# Patient Record
Sex: FEMALE | Race: WHITE | Employment: FULL TIME | URBAN - METROPOLITAN AREA
[De-identification: names, ages, dates, MRNs, and addresses within clinical notes are randomized per-mention and may not be internally consistent; named-entity substitution may affect disease eponyms.]

---

## 2020-09-22 ENCOUNTER — OFFICE VISIT (OUTPATIENT)
Dept: OBGYN CLINIC | Age: 40
End: 2020-09-22
Payer: COMMERCIAL

## 2020-09-22 ENCOUNTER — HOSPITAL ENCOUNTER (OUTPATIENT)
Age: 40
Setting detail: SPECIMEN
Discharge: HOME | End: 2020-09-22
Payer: COMMERCIAL

## 2020-09-22 VITALS
SYSTOLIC BLOOD PRESSURE: 107 MMHG | DIASTOLIC BLOOD PRESSURE: 79 MMHG | HEIGHT: 63 IN | WEIGHT: 223 LBS | HEART RATE: 84 BPM | BODY MASS INDEX: 39.51 KG/M2

## 2020-09-22 LAB
ABSOLUTE EOS #: 0.19 K/UL (ref 0–0.44)
ABSOLUTE IMMATURE GRANULOCYTE: 0.03 K/UL (ref 0–0.3)
ABSOLUTE LYMPH #: 2.48 K/UL (ref 1.1–3.7)
ABSOLUTE MONO #: 0.64 K/UL (ref 0.1–1.2)
BASOPHILS # BLD: 1 % (ref 0–2)
BASOPHILS ABSOLUTE: 0.12 K/UL (ref 0–0.2)
DIFFERENTIAL TYPE: NORMAL
EOSINOPHILS RELATIVE PERCENT: 2 % (ref 1–4)
HCT VFR BLD CALC: 43.9 % (ref 36.3–47.1)
HEMOGLOBIN: 13.6 G/DL (ref 11.9–15.1)
IMMATURE GRANULOCYTES: 0 %
LYMPHOCYTES # BLD: 27 % (ref 24–43)
MCH RBC QN AUTO: 29.2 PG (ref 25.2–33.5)
MCHC RBC AUTO-ENTMCNC: 31 G/DL (ref 28.4–34.8)
MCV RBC AUTO: 94.4 FL (ref 82.6–102.9)
MONOCYTES # BLD: 7 % (ref 3–12)
NRBC AUTOMATED: 0 PER 100 WBC
PDW BLD-RTO: 13.2 % (ref 11.8–14.4)
PLATELET # BLD: NORMAL K/UL (ref 138–453)
PLATELET ESTIMATE: NORMAL
PLATELET, FLUORESCENCE: 203 K/UL (ref 138–453)
PLATELET, IMMATURE FRACTION: 16.8 % (ref 1.1–10.3)
PMV BLD AUTO: NORMAL FL (ref 8.1–13.5)
RBC # BLD: 4.65 M/UL (ref 3.95–5.11)
RBC # BLD: NORMAL 10*6/UL
SEG NEUTROPHILS: 62 % (ref 36–65)
SEGMENTED NEUTROPHILS ABSOLUTE COUNT: 5.69 K/UL (ref 1.5–8.1)
TSH SERPL DL<=0.05 MIU/L-ACNC: 1.08 MIU/L (ref 0.3–5)
WBC # BLD: 9.2 K/UL (ref 3.5–11.3)
WBC # BLD: NORMAL 10*3/UL

## 2020-09-22 PROCEDURE — 99385 PREV VISIT NEW AGE 18-39: CPT | Performed by: OBSTETRICS & GYNECOLOGY

## 2020-09-22 ASSESSMENT — ENCOUNTER SYMPTOMS
COUGH: 0
ABDOMINAL PAIN: 0
SHORTNESS OF BREATH: 0
BACK PAIN: 0

## 2020-09-22 NOTE — PROGRESS NOTES
[facility] PHYSICIANS  [facility] - [address]  Dept: [phone]    Chief complaint:   Chief Complaint   Patient presents with    New Patient    Gynecologic Exam     Last pap pt states was 3 years ago in [place]        History Present Illness: Lydia Aviles is a 46 yo female who presents for her annual exam, and to establish care. She says that her periods are getting longer and are lasting 7-9 days now. She says she is having lots of clots. She says they are very painful as well. She is sexually active and denies any dyspareunia. She denies any vaginal discharge. She is single in a relationship with a male partner. She denies any bowel or bladder issues. Current Medications (OTC/Herbal): No current outpatient medications on file. No current facility-administered medications for this visit.       Allergies: No Known Allergies  Past Medical History:   Past Medical History:   Diagnosis Date    Abnormal Pap smear of cervix      Past Surgical History:   Past Surgical History:   Procedure Laterality Date     SECTION       SECTION      CHOLECYSTECTOMY  2009    LEEP      TUBAL LIGATION       Obstetric History:   4  Para 2  Gynecologic History: LMP 21   Menarche 13  Duration 7-9 d    Interval q  30 d  Tampons/Pads in a day: 12-15  Last Pap: 3 yrs ago       Any history of abnormal paps yes    PriorColpo/Biopsy LEEP in     Last Mammogram n/a  Contraception: BTL  Complications: none  STDs: none  Psychosocial History: Occupation:      Caffeine Yes    At risk for depression No    Abuse:   No  Seatbelt:   Yes  Exercise:  No    Social History     Socioeconomic History    Marital status: Single     Spouse name: Not on file    Number of children: Not on file    Years of education: Not on file    Highest education level: Not on file   Occupational History    Not on file   Social Needs    Financial resource strain: Not on file    Food insecurity     Worry: Not on file     Inability: Not on file    Transportation needs     Medical: Not on file     Non-medical: Not on file   Tobacco Use    Smoking status: Current Every Day Smoker    Smokeless tobacco: Never Used   Substance and Sexual Activity    Alcohol use: Yes     Comment: social     Drug use: Never    Sexual activity: Yes     Partners: Male   Lifestyle    Physical activity     Days per week: Not on file     Minutes per session: Not on file    Stress: Not on file   Relationships    Social connections     Talks on phone: Not on file     Gets together: Not on file     Attends Worship service: Not on file     Active member of club or organization: Not on file     Attends meetings of clubs or organizations: Not on file     Relationship status: Not on file    Intimate partner violence     Fear of current or ex partner: Not on file     Emotionally abused: Not on file     Physically abused: Not on file     Forced sexual activity: Not on file   Other Topics Concern    Not on file   Social History Narrative    Not on file       History reviewed. No pertinent family history. Review of Systems:   Review of Systems   Constitutional: Negative for chills and fever. HENT: Negative for congestion. Respiratory: Negative for cough and shortness of breath. Cardiovascular: Negative for chest pain and palpitations. Gastrointestinal: Negative for abdominal pain. Musculoskeletal: Negative for back pain. Neurological: Negative for dizziness and light-headedness. Psychiatric/Behavioral: The patient is not nervous/anxious. Physical exam:  vitals:  Height   5  ft    3 in,  Weight    223 lbs,   107/79 BP  Gen: alert, no apparent distress  HEENT:No pathologic skin lesions noted,NC/AT,PERRL, normal midline nontender thyroid   Lung Exam: Clear to auscultation in all fields bilaterally, without wheezes,rales or rhonchi.   Cardiac Exam: Normal sinus rhythm andrate, without murmurs, rubs or gallops appreciated. Breast Exam: Symmetric without pathological skin changes, nontender without discrete suspicious masses palpated, supraclavicular or axillary adenopathy or nipple discharge noted. Abdominal Exam: Nontender to deep palpation without organomegaly, masses or CVAT appreciated, BS positive. No spinal deformation or tenderness. External Genitalia: Normal development without vulvar,vaginal or cervical lesions noted. Normal vaginal discharge, uterus anterior, 4-6 weeks without CMT. Adnexa nontender without abnormal masses bilaterally. Rectal Exam: Omitted. Extremities: Nontender without clubbing, cyanosis or edema. F.R.O.M. Neurologic Exam: Grossly intact without noted sensorimotor deficits and oriented x 3. Assessment/Plan:   Unremarkable annual Gyn exam.    Cervical Cytology Evaluation begins at 24years old. If Negative Cytology, Follow-up screening per current guidelines. Mammograms every 1year. If 35 yo and last mammogram was negative. Colonoscopy screening reviewed as well as onset for bone density testing. Heavy menstrual bleeding - CBC and TSH ordered. Discussed options of hormonal treatment vs surgical.  Discussed risks/benefits of each in detail. Info about IUD, ablation and hysterectomy given. Birth control and barrier recommendations discussed. STD counseling and prevention reviewed. Routine health maintenance per patients PCP.   Pt to follow up for annual exam in 1 year    Kris Lau MD  [address]

## 2020-09-27 LAB
HPV SOURCE: NORMAL
HPV, GENOTYPE 16: NOT DETECTED
HPV, GENOTYPE 18: NOT DETECTED
HPV, HIGH RISK OTHER: NOT DETECTED

## 2020-10-02 LAB — CYTOLOGY REPORT: NORMAL

## 2020-10-05 RX ORDER — METRONIDAZOLE 500 MG/1
500 TABLET ORAL 2 TIMES DAILY
Qty: 14 TABLET | Refills: 0 | Status: SHIPPED | OUTPATIENT
Start: 2020-10-05 | End: 2020-10-12

## 2020-11-09 ENCOUNTER — PROCEDURE VISIT (OUTPATIENT)
Dept: OBGYN CLINIC | Age: 40
End: 2020-11-09
Payer: COMMERCIAL

## 2020-11-09 ENCOUNTER — HOSPITAL ENCOUNTER (OUTPATIENT)
Age: 40
Setting detail: SPECIMEN
Discharge: HOME | End: 2020-11-09
Payer: COMMERCIAL

## 2020-11-09 VITALS
BODY MASS INDEX: 40.09 KG/M2 | SYSTOLIC BLOOD PRESSURE: 119 MMHG | HEART RATE: 96 BPM | WEIGHT: 226.25 LBS | HEIGHT: 63 IN | DIASTOLIC BLOOD PRESSURE: 83 MMHG

## 2020-11-09 PROCEDURE — 58100 BIOPSY OF UTERUS LINING: CPT | Performed by: OBSTETRICS & GYNECOLOGY

## 2020-11-09 NOTE — PROGRESS NOTES
[facility] PHYSICIANS  [facility] - [address] [facility]  Dept: [phone]    11/9/2020    Alyse Hui is a 45 y.o. female, [id number]      Patient's last menstrual period was 10/15/2020. Chief Complaint   Patient presents with    Procedure     EMB        LABS:  UPT: not done, pt previously had a BTL    No visits with results within 6 Week(s) from this visit.    Latest known visit with results is:   Hospital Outpatient Visit on 09/22/2020   Component Date Value Ref Range Status    TSH 09/22/2020 1.08  0.30 - 5.00 mIU/L Final    WBC 09/22/2020 9.2  3.5 - 11.3 k/uL Final    RBC 09/22/2020 4.65  3.95 - 5.11 m/uL Final    Hemoglobin 09/22/2020 13.6  11.9 - 15.1 g/dL Final    Hematocrit 09/22/2020 43.9  36.3 - 47.1 % Final    MCV 09/22/2020 94.4  82.6 - 102.9 fL Final    MCH 09/22/2020 29.2  25.2 - 33.5 pg Final    MCHC 09/22/2020 31.0  28.4 - 34.8 g/dL Final    RDW 09/22/2020 13.2  11.8 - 14.4 % Final    Platelets 06/77/4290 See Reflexed IPF Result  138 - 453 k/uL Final    MPV 09/22/2020 NOT REPORTED  8.1 - 13.5 fL Final    NRBC Automated 09/22/2020 0.0  0.0 per 100 WBC Final    Differential Type 09/22/2020 NOT REPORTED   Final    WBC Morphology 09/22/2020 NOT REPORTED   Final    RBC Morphology 09/22/2020 NOT REPORTED   Final    Platelet Estimate 21/15/3102 NOT REPORTED   Final    Seg Neutrophils 09/22/2020 62  36 - 65 % Final    Lymphocytes 09/22/2020 27  24 - 43 % Final    Monocytes 09/22/2020 7  3 - 12 % Final    Eosinophils % 09/22/2020 2  1 - 4 % Final    Basophils 09/22/2020 1  0 - 2 % Final    Immature Granulocytes 09/22/2020 0  0 % Final    Segs Absolute 09/22/2020 5.69  1.50 - 8.10 k/uL Final    Absolute Lymph # 09/22/2020 2.48  1.10 - 3.70 k/uL Final    Absolute Mono # 09/22/2020 0.64  0.10 - 1.20 k/uL Final    Absolute Eos # 09/22/2020 0.19  0.00 - 0.44 k/uL Final    Basophils Absolute 09/22/2020 0.12  0.00 - 0.20 k/uL Final  Absolute Immature Granulocyte 09/22/2020 0.03  0.00 - 0.30 k/uL Final    Platelet, Immature Fraction 09/22/2020 16.8* 1.1 - 10.3 % Final    Platelet, Fluorescence 09/22/2020 203  138 - 453 k/uL Final    Cytology Report 09/22/2020    Final-Edited                    Value:INTERPRETATION    Cervical material, (ThinPrep vial, Imaging-assisted review):  Specimen Adequacy:       Satisfactory for evaluation.       - Endocervical/transformation zone component present. Descriptive Diagnosis:       Negative for intraepithelial lesion or malignancy. Trichomonas vaginalis present. Shift in kaleb suggestive of bacterial vaginosis. Comments:       High Risk HPV testing was ordered. Cytotechnologist:   JAMES Reed JD(ASCP)  **Electronically Signed Out**  fnay/10/2/2020          Procedure/Addendum  HPV Procedure Report     Date Ordered:     9/23/2020     Status:  Signed Out       Date Complete:     9/23/2020     By: JAMES Wells(ASCP)       Date Reported:     10/2/2020       INTERPRETATION  Aptima HPV DNA High Risk                                  HPV Sample               Thin Prep                    (Ref Range)  HPV Type 16               Not Detected                    (Not  Detected)  HPV Type 18               Not Detected                    (Not  De                          tected)  Other High Risk HPV     Not Detected                    (Not Detected)    HPV by Nucleic Acid Amplification     This test detects high-risk HPV types (16, 18, 31, 33, 35, 39, 45,  51, 52, 56, 58, 59, 66, and 68) and differentiates HPV 16 and 18  associated with cervical cancer and its precursor lesions. Sensitivity may be affected by specimen collection methods, stage of  infection, and the presence of interfering substances. Results should  be interpreted in conjunction with other available laboratory and  clinical data.   A negative high-risk HPV result does not exclude the  presence of other high-risk HPV types, the and 18   associated with cervical cancer and its precursor lesions. Sensitivity may be affected by specimen collection methods, stage   of infection, and the presence of interfering substances. Results   should be interpreted in conjunction with other available   laboratory and clinical data. A negative high-risk HPV result does   not exclude the presence of other high-risk HPV types, the   possibility of future cytologic abnormalities, underlying CIN2-3,   or cancer. This test is intended for medical purposes only and is not valid   for the evaluation of suspected sexual abuse or for other forensic   purposes. HPV testing should not be used for screening or   management of atypical squamous cells of undetermined significance   (ASCUS) in women under age 24. Performed By: Dom Butler [address]  [place], [address] [facility]  : Vicki Vegas. Rosa Almaraz MD     ]    Past Medical History:   Diagnosis Date    Abnormal Pap smear of cervix          Past Surgical History:   Procedure Laterality Date     SECTION       SECTION      CHOLECYSTECTOMY  2009    LEEP  2006    TUBAL LIGATION           History reviewed. No pertinent family history. Social History     Tobacco Use    Smoking status: Current Every Day Smoker    Smokeless tobacco: Never Used   Substance Use Topics    Alcohol use: Yes     Comment: social     Drug use: Never         No current outpatient medications on file. No current facility-administered medications for this visit. Allergies as of 2020    (No Known Allergies)         Diagnostics:  No results found. Blood pressure 119/83, pulse 96, height 5' 3\" (1.6 m), weight 226 lb 4 oz (102.6 kg), last menstrual period 10/15/2020. Chaperone for Intimate Exam   Chaperone was offered and accepted as part of the rooming process.    Chaperone: Estelle Waters         The patient was counseled on the procedure. Risks, benefits and alternatives were reviewed. The patient is aware that this is diagnostic and not curative and a second procedure may be needed. A consent was reviewed and obtained. The patient was positioned comfortably on the exam table. After a bi-manual exam; the uterus was found to be  anteverted with a size of 9 cm. There was no adnexal masses and the bladder was smooth, non-tender and without palpable masses. A sterile speculum was placed into the vagina and the cervix was identified. It was stabilized with a tenaculum clamp. It was cleansed with betadine and the aspirator was then gently passed into the endometrial cavity. Tissue was obtained and sent to pathology. The patient tolerated the procedure well. Post procedure restrictions were reviewed and given to the patient. All counts and instruments were correct at the end of the procedure. Assessment:   Diagnosis Orders   1. Menorrhagia with regular cycle  57300 - OR BIOPSY OF UTERUS LINING     There are no active problems to display for this patient. PLAN:  Pt is desiring a hysterectomy. Discussed with her h/o 2 prior LTCS recommendation for RALH with BS and cysto. Pt in agreement. Discussed risks of surgery in normal detailed fashion. All questions answered.   Will schedule pt for RALH, BS and cysto    Swathi Stout MD

## 2020-11-11 LAB — SURGICAL PATHOLOGY REPORT: NORMAL

## 2021-01-05 ENCOUNTER — TELEPHONE (OUTPATIENT)
Dept: OBGYN CLINIC | Age: 41
End: 2021-01-05

## 2021-01-05 NOTE — TELEPHONE ENCOUNTER
LVM letting pt know we need her to call back to verify her insurance. Krishan Later from [facility]. V's stated he talked to her insurance and they said her coverage ended 12/31/20.

## 2021-01-08 ENCOUNTER — TELEPHONE (OUTPATIENT)
Dept: OBGYN CLINIC | Age: 41
End: 2021-01-08

## 2021-01-08 NOTE — TELEPHONE ENCOUNTER
[facility] called she wanted to let us know they are reaching out to the pt because she will be marked as self pay for the surgery because her benefits do not cover her surgery

## 2021-01-10 ENCOUNTER — HOSPITAL ENCOUNTER (OUTPATIENT)
Dept: LAB | Age: 41
Setting detail: SPECIMEN
Discharge: HOME | End: 2021-01-10
Payer: COMMERCIAL

## 2021-01-10 DIAGNOSIS — Z01.818 PREOP TESTING: Primary | ICD-10-CM

## 2021-01-10 PROCEDURE — U0003 INFECTIOUS AGENT DETECTION BY NUCLEIC ACID (DNA OR RNA); SEVERE ACUTE RESPIRATORY SYNDROME CORONAVIRUS 2 (SARS-COV-2) (CORONAVIRUS DISEASE [COVID-19]), AMPLIFIED PROBE TECHNIQUE, MAKING USE OF HIGH THROUGHPUT TECHNOLOGIES AS DESCRIBED BY CMS-2020-01-R: HCPCS

## 2021-01-10 PROCEDURE — U0005 INFEC AGEN DETEC AMPLI PROBE: HCPCS

## 2021-01-11 LAB
SARS-COV-2, RAPID: NORMAL
SARS-COV-2: NORMAL
SARS-COV-2: NOT DETECTED
SOURCE: NORMAL

## 2021-01-13 ENCOUNTER — ANESTHESIA EVENT (OUTPATIENT)
Dept: OPERATING ROOM | Age: 41
End: 2021-01-13

## 2021-01-14 ENCOUNTER — HOSPITAL ENCOUNTER (OUTPATIENT)
Age: 41
Setting detail: OUTPATIENT SURGERY
Discharge: HOME | End: 2021-01-14
Attending: OBSTETRICS & GYNECOLOGY | Admitting: OBSTETRICS & GYNECOLOGY
Payer: COMMERCIAL

## 2021-01-14 ENCOUNTER — ANESTHESIA (OUTPATIENT)
Dept: OPERATING ROOM | Age: 41
End: 2021-01-14

## 2021-01-14 VITALS
TEMPERATURE: 97.2 F | HEART RATE: 76 BPM | SYSTOLIC BLOOD PRESSURE: 124 MMHG | RESPIRATION RATE: 16 BRPM | WEIGHT: 230 LBS | BODY MASS INDEX: 40.75 KG/M2 | HEIGHT: 63 IN | OXYGEN SATURATION: 100 % | DIASTOLIC BLOOD PRESSURE: 76 MMHG

## 2021-01-14 VITALS — TEMPERATURE: 97.4 F | SYSTOLIC BLOOD PRESSURE: 112 MMHG | DIASTOLIC BLOOD PRESSURE: 99 MMHG | OXYGEN SATURATION: 100 %

## 2021-01-14 DIAGNOSIS — G89.18 POST-OP PAIN: Primary | ICD-10-CM

## 2021-01-14 PROBLEM — Z90.710 H/O TOTAL HYSTERECTOMY: Status: ACTIVE | Noted: 2021-01-14

## 2021-01-14 LAB
ABO/RH: NORMAL
ANTIBODY SCREEN: NEGATIVE
ARM BAND NUMBER: NORMAL
EXPIRATION DATE: NORMAL
HCG, PREGNANCY URINE (POC): NEGATIVE
HCT VFR BLD CALC: 40.8 % (ref 36.3–47.1)
HEMOGLOBIN: 12.3 G/DL (ref 11.9–15.1)
MCH RBC QN AUTO: 29.8 PG (ref 25.2–33.5)
MCHC RBC AUTO-ENTMCNC: 30.1 G/DL (ref 28.4–34.8)
MCV RBC AUTO: 98.8 FL (ref 82.6–102.9)
NRBC AUTOMATED: 0 PER 100 WBC
PDW BLD-RTO: 13.2 % (ref 11.8–14.4)
PLATELET # BLD: 154 K/UL (ref 138–453)
PMV BLD AUTO: 12.5 FL (ref 8.1–13.5)
RBC # BLD: 4.13 M/UL (ref 3.95–5.11)
WBC # BLD: 10.4 K/UL (ref 3.5–11.3)

## 2021-01-14 PROCEDURE — 3600000009 HC SURGERY ROBOT BASE: Performed by: OBSTETRICS & GYNECOLOGY

## 2021-01-14 PROCEDURE — 3600000019 HC SURGERY ROBOT ADDTL 15MIN: Performed by: OBSTETRICS & GYNECOLOGY

## 2021-01-14 PROCEDURE — 6360000002 HC RX W HCPCS

## 2021-01-14 PROCEDURE — 3700000000 HC ANESTHESIA ATTENDED CARE: Performed by: OBSTETRICS & GYNECOLOGY

## 2021-01-14 PROCEDURE — 2580000003 HC RX 258: Performed by: ANESTHESIOLOGY

## 2021-01-14 PROCEDURE — 88307 TISSUE EXAM BY PATHOLOGIST: CPT

## 2021-01-14 PROCEDURE — 2500000003 HC RX 250 WO HCPCS: Performed by: ANESTHESIOLOGY

## 2021-01-14 PROCEDURE — 2500000003 HC RX 250 WO HCPCS: Performed by: NURSE ANESTHETIST, CERTIFIED REGISTERED

## 2021-01-14 PROCEDURE — 6360000002 HC RX W HCPCS: Performed by: NURSE ANESTHETIST, CERTIFIED REGISTERED

## 2021-01-14 PROCEDURE — 58571 TLH W/T/O 250 G OR LESS: CPT | Performed by: OBSTETRICS & GYNECOLOGY

## 2021-01-14 PROCEDURE — 86900 BLOOD TYPING SEROLOGIC ABO: CPT

## 2021-01-14 PROCEDURE — 64488 TAP BLOCK BI INJECTION: CPT | Performed by: ANESTHESIOLOGY

## 2021-01-14 PROCEDURE — 85027 COMPLETE CBC AUTOMATED: CPT

## 2021-01-14 PROCEDURE — 86901 BLOOD TYPING SEROLOGIC RH(D): CPT

## 2021-01-14 PROCEDURE — 7100000001 HC PACU RECOVERY - ADDTL 15 MIN: Performed by: OBSTETRICS & GYNECOLOGY

## 2021-01-14 PROCEDURE — 2500000003 HC RX 250 WO HCPCS: Performed by: OBSTETRICS & GYNECOLOGY

## 2021-01-14 PROCEDURE — 52000 CYSTOURETHROSCOPY: CPT | Performed by: OBSTETRICS & GYNECOLOGY

## 2021-01-14 PROCEDURE — 86850 RBC ANTIBODY SCREEN: CPT

## 2021-01-14 PROCEDURE — 6360000002 HC RX W HCPCS: Performed by: OBSTETRICS & GYNECOLOGY

## 2021-01-14 PROCEDURE — S2900 ROBOTIC SURGICAL SYSTEM: HCPCS | Performed by: OBSTETRICS & GYNECOLOGY

## 2021-01-14 PROCEDURE — 6370000000 HC RX 637 (ALT 250 FOR IP): Performed by: OBSTETRICS & GYNECOLOGY

## 2021-01-14 PROCEDURE — 3700000001 HC ADD 15 MINUTES (ANESTHESIA): Performed by: OBSTETRICS & GYNECOLOGY

## 2021-01-14 PROCEDURE — 2709999900 HC NON-CHARGEABLE SUPPLY: Performed by: OBSTETRICS & GYNECOLOGY

## 2021-01-14 PROCEDURE — 81025 URINE PREGNANCY TEST: CPT

## 2021-01-14 PROCEDURE — 7100000040 HC SPAR PHASE II RECOVERY - FIRST 15 MIN: Performed by: OBSTETRICS & GYNECOLOGY

## 2021-01-14 PROCEDURE — 2720000010 HC SURG SUPPLY STERILE: Performed by: OBSTETRICS & GYNECOLOGY

## 2021-01-14 PROCEDURE — 2580000003 HC RX 258: Performed by: OBSTETRICS & GYNECOLOGY

## 2021-01-14 PROCEDURE — 7100000000 HC PACU RECOVERY - FIRST 15 MIN: Performed by: OBSTETRICS & GYNECOLOGY

## 2021-01-14 RX ORDER — SODIUM CHLORIDE 0.9 % (FLUSH) 0.9 %
10 SYRINGE (ML) INJECTION EVERY 12 HOURS SCHEDULED
Status: DISCONTINUED | OUTPATIENT
Start: 2021-01-14 | End: 2021-01-14 | Stop reason: HOSPADM

## 2021-01-14 RX ORDER — MAGNESIUM HYDROXIDE 1200 MG/15ML
LIQUID ORAL PRN
Status: DISCONTINUED | OUTPATIENT
Start: 2021-01-14 | End: 2021-01-14 | Stop reason: ALTCHOICE

## 2021-01-14 RX ORDER — SODIUM CHLORIDE 0.9 % (FLUSH) 0.9 %
10 SYRINGE (ML) INJECTION PRN
Status: DISCONTINUED | OUTPATIENT
Start: 2021-01-14 | End: 2021-01-14 | Stop reason: HOSPADM

## 2021-01-14 RX ORDER — HYDROCODONE BITARTRATE AND ACETAMINOPHEN 5; 325 MG/1; MG/1
1 TABLET ORAL EVERY 4 HOURS PRN
Qty: 28 TABLET | Refills: 0 | Status: SHIPPED | OUTPATIENT
Start: 2021-01-14 | End: 2021-01-21

## 2021-01-14 RX ORDER — SODIUM CHLORIDE, SODIUM LACTATE, POTASSIUM CHLORIDE, CALCIUM CHLORIDE 600; 310; 30; 20 MG/100ML; MG/100ML; MG/100ML; MG/100ML
INJECTION, SOLUTION INTRAVENOUS CONTINUOUS
Status: DISCONTINUED | OUTPATIENT
Start: 2021-01-14 | End: 2021-01-14 | Stop reason: HOSPADM

## 2021-01-14 RX ORDER — MIDAZOLAM HYDROCHLORIDE 1 MG/ML
INJECTION INTRAMUSCULAR; INTRAVENOUS
Status: COMPLETED
Start: 2021-01-14 | End: 2021-01-14

## 2021-01-14 RX ORDER — DEXAMETHASONE SODIUM PHOSPHATE 4 MG/ML
INJECTION, SOLUTION INTRA-ARTICULAR; INTRALESIONAL; INTRAMUSCULAR; INTRAVENOUS; SOFT TISSUE PRN
Status: DISCONTINUED | OUTPATIENT
Start: 2021-01-14 | End: 2021-01-14 | Stop reason: SDUPTHER

## 2021-01-14 RX ORDER — FENTANYL CITRATE 50 UG/ML
50 INJECTION, SOLUTION INTRAMUSCULAR; INTRAVENOUS EVERY 5 MIN PRN
Status: DISCONTINUED | OUTPATIENT
Start: 2021-01-14 | End: 2021-01-14 | Stop reason: HOSPADM

## 2021-01-14 RX ORDER — SCOLOPAMINE TRANSDERMAL SYSTEM 1 MG/1
1 PATCH, EXTENDED RELEASE TRANSDERMAL ONCE
Status: DISCONTINUED | OUTPATIENT
Start: 2021-01-14 | End: 2021-01-14 | Stop reason: HOSPADM

## 2021-01-14 RX ORDER — MAGNESIUM HYDROXIDE 1200 MG/15ML
LIQUID ORAL CONTINUOUS PRN
Status: COMPLETED | OUTPATIENT
Start: 2021-01-14 | End: 2021-01-14

## 2021-01-14 RX ORDER — ROCURONIUM BROMIDE 10 MG/ML
INJECTION, SOLUTION INTRAVENOUS PRN
Status: DISCONTINUED | OUTPATIENT
Start: 2021-01-14 | End: 2021-01-14 | Stop reason: SDUPTHER

## 2021-01-14 RX ORDER — NEOSTIGMINE METHYLSULFATE 5 MG/5 ML
SYRINGE (ML) INTRAVENOUS PRN
Status: DISCONTINUED | OUTPATIENT
Start: 2021-01-14 | End: 2021-01-14 | Stop reason: SDUPTHER

## 2021-01-14 RX ORDER — GLYCOPYRROLATE 1 MG/5 ML
SYRINGE (ML) INTRAVENOUS PRN
Status: DISCONTINUED | OUTPATIENT
Start: 2021-01-14 | End: 2021-01-14 | Stop reason: SDUPTHER

## 2021-01-14 RX ORDER — FENTANYL CITRATE 50 UG/ML
25 INJECTION, SOLUTION INTRAMUSCULAR; INTRAVENOUS EVERY 5 MIN PRN
Status: DISCONTINUED | OUTPATIENT
Start: 2021-01-14 | End: 2021-01-14 | Stop reason: HOSPADM

## 2021-01-14 RX ORDER — KETOROLAC TROMETHAMINE 30 MG/ML
INJECTION, SOLUTION INTRAMUSCULAR; INTRAVENOUS PRN
Status: DISCONTINUED | OUTPATIENT
Start: 2021-01-14 | End: 2021-01-14 | Stop reason: SDUPTHER

## 2021-01-14 RX ORDER — FENTANYL CITRATE 50 UG/ML
50 INJECTION, SOLUTION INTRAMUSCULAR; INTRAVENOUS ONCE
Status: COMPLETED | OUTPATIENT
Start: 2021-01-14 | End: 2021-01-14

## 2021-01-14 RX ORDER — BUPIVACAINE HYDROCHLORIDE AND EPINEPHRINE 2.5; 5 MG/ML; UG/ML
INJECTION, SOLUTION EPIDURAL; INFILTRATION; INTRACAUDAL; PERINEURAL PRN
Status: DISCONTINUED | OUTPATIENT
Start: 2021-01-14 | End: 2021-01-14 | Stop reason: ALTCHOICE

## 2021-01-14 RX ORDER — PROPOFOL 10 MG/ML
INJECTION, EMULSION INTRAVENOUS PRN
Status: DISCONTINUED | OUTPATIENT
Start: 2021-01-14 | End: 2021-01-14 | Stop reason: SDUPTHER

## 2021-01-14 RX ORDER — MIDAZOLAM HYDROCHLORIDE 2 MG/2ML
1 INJECTION, SOLUTION INTRAMUSCULAR; INTRAVENOUS EVERY 10 MIN PRN
Status: DISCONTINUED | OUTPATIENT
Start: 2021-01-14 | End: 2021-01-14 | Stop reason: HOSPADM

## 2021-01-14 RX ORDER — MIDAZOLAM HYDROCHLORIDE 2 MG/2ML
1 INJECTION, SOLUTION INTRAMUSCULAR; INTRAVENOUS ONCE
Status: COMPLETED | OUTPATIENT
Start: 2021-01-14 | End: 2021-01-14

## 2021-01-14 RX ORDER — FENTANYL CITRATE 50 UG/ML
INJECTION, SOLUTION INTRAMUSCULAR; INTRAVENOUS PRN
Status: DISCONTINUED | OUTPATIENT
Start: 2021-01-14 | End: 2021-01-14 | Stop reason: SDUPTHER

## 2021-01-14 RX ORDER — LIDOCAINE HYDROCHLORIDE 10 MG/ML
1 INJECTION, SOLUTION EPIDURAL; INFILTRATION; INTRACAUDAL; PERINEURAL
Status: DISCONTINUED | OUTPATIENT
Start: 2021-01-14 | End: 2021-01-14 | Stop reason: HOSPADM

## 2021-01-14 RX ORDER — ONDANSETRON 2 MG/ML
INJECTION INTRAMUSCULAR; INTRAVENOUS PRN
Status: DISCONTINUED | OUTPATIENT
Start: 2021-01-14 | End: 2021-01-14 | Stop reason: SDUPTHER

## 2021-01-14 RX ORDER — BUPIVACAINE HYDROCHLORIDE 5 MG/ML
INJECTION, SOLUTION EPIDURAL; INTRACAUDAL
Status: COMPLETED | OUTPATIENT
Start: 2021-01-14 | End: 2021-01-14

## 2021-01-14 RX ORDER — ONDANSETRON 4 MG/1
4 TABLET, ORALLY DISINTEGRATING ORAL EVERY 8 HOURS PRN
Qty: 15 TABLET | Refills: 0 | Status: SHIPPED | OUTPATIENT
Start: 2021-01-14

## 2021-01-14 RX ORDER — BUPIVACAINE HYDROCHLORIDE 5 MG/ML
30 INJECTION, SOLUTION EPIDURAL; INTRACAUDAL ONCE
Status: COMPLETED | OUTPATIENT
Start: 2021-01-14 | End: 2021-01-14

## 2021-01-14 RX ORDER — IBUPROFEN 600 MG/1
600 TABLET ORAL EVERY 6 HOURS PRN
Qty: 60 TABLET | Refills: 0 | Status: SHIPPED | OUTPATIENT
Start: 2021-01-14

## 2021-01-14 RX ORDER — SIMETHICONE 80 MG
80 TABLET,CHEWABLE ORAL 4 TIMES DAILY PRN
Qty: 60 TABLET | Refills: 0 | Status: SHIPPED | OUTPATIENT
Start: 2021-01-14 | End: 2021-03-01

## 2021-01-14 RX ORDER — MEPERIDINE HYDROCHLORIDE 50 MG/ML
12.5 INJECTION INTRAMUSCULAR; INTRAVENOUS; SUBCUTANEOUS EVERY 5 MIN PRN
Status: DISCONTINUED | OUTPATIENT
Start: 2021-01-14 | End: 2021-01-14 | Stop reason: HOSPADM

## 2021-01-14 RX ORDER — DOCUSATE SODIUM 100 MG/1
100 CAPSULE, LIQUID FILLED ORAL 2 TIMES DAILY PRN
Qty: 60 CAPSULE | Refills: 0 | Status: SHIPPED | OUTPATIENT
Start: 2021-01-14 | End: 2021-03-01

## 2021-01-14 RX ORDER — FENTANYL CITRATE 50 UG/ML
INJECTION, SOLUTION INTRAMUSCULAR; INTRAVENOUS
Status: COMPLETED
Start: 2021-01-14 | End: 2021-01-14

## 2021-01-14 RX ORDER — LIDOCAINE HYDROCHLORIDE 10 MG/ML
INJECTION, SOLUTION EPIDURAL; INFILTRATION; INTRACAUDAL; PERINEURAL PRN
Status: DISCONTINUED | OUTPATIENT
Start: 2021-01-14 | End: 2021-01-14 | Stop reason: SDUPTHER

## 2021-01-14 RX ADMIN — FENTANYL CITRATE 50 MCG: 50 INJECTION, SOLUTION INTRAMUSCULAR; INTRAVENOUS at 07:28

## 2021-01-14 RX ADMIN — ROCURONIUM BROMIDE 50 MG: 10 INJECTION INTRAVENOUS at 07:28

## 2021-01-14 RX ADMIN — ROCURONIUM BROMIDE 10 MG: 10 INJECTION INTRAVENOUS at 08:28

## 2021-01-14 RX ADMIN — KETOROLAC TROMETHAMINE 30 MG: 30 INJECTION, SOLUTION INTRAMUSCULAR at 09:44

## 2021-01-14 RX ADMIN — ONDANSETRON 4 MG: 2 INJECTION INTRAMUSCULAR; INTRAVENOUS at 09:44

## 2021-01-14 RX ADMIN — Medication 3 MG: at 09:49

## 2021-01-14 RX ADMIN — FENTANYL CITRATE 50 MCG: 50 INJECTION, SOLUTION INTRAMUSCULAR; INTRAVENOUS at 08:04

## 2021-01-14 RX ADMIN — SODIUM CHLORIDE, POTASSIUM CHLORIDE, SODIUM LACTATE AND CALCIUM CHLORIDE: 600; 310; 30; 20 INJECTION, SOLUTION INTRAVENOUS at 07:34

## 2021-01-14 RX ADMIN — FENTANYL CITRATE 50 MCG: 50 INJECTION, SOLUTION INTRAMUSCULAR; INTRAVENOUS at 07:19

## 2021-01-14 RX ADMIN — SODIUM CHLORIDE, POTASSIUM CHLORIDE, SODIUM LACTATE AND CALCIUM CHLORIDE: 600; 310; 30; 20 INJECTION, SOLUTION INTRAVENOUS at 06:34

## 2021-01-14 RX ADMIN — BUPIVACAINE HYDROCHLORIDE 40 ML: 5 INJECTION, SOLUTION EPIDURAL; INTRACAUDAL; PERINEURAL at 07:45

## 2021-01-14 RX ADMIN — Medication 2 G: at 07:40

## 2021-01-14 RX ADMIN — DEXAMETHASONE SODIUM PHOSPHATE 4 MG: 4 INJECTION, SOLUTION INTRAMUSCULAR; INTRAVENOUS at 07:28

## 2021-01-14 RX ADMIN — ROCURONIUM BROMIDE 10 MG: 10 INJECTION INTRAVENOUS at 08:57

## 2021-01-14 RX ADMIN — MIDAZOLAM HYDROCHLORIDE 1 MG: 1 INJECTION, SOLUTION INTRAMUSCULAR; INTRAVENOUS at 07:11

## 2021-01-14 RX ADMIN — SODIUM CHLORIDE, POTASSIUM CHLORIDE, SODIUM LACTATE AND CALCIUM CHLORIDE: 600; 310; 30; 20 INJECTION, SOLUTION INTRAVENOUS at 07:20

## 2021-01-14 RX ADMIN — LIDOCAINE HYDROCHLORIDE 50 MG: 10 INJECTION, SOLUTION EPIDURAL; INFILTRATION; INTRACAUDAL; PERINEURAL at 07:28

## 2021-01-14 RX ADMIN — Medication 0.4 MG: at 09:49

## 2021-01-14 RX ADMIN — Medication 40 ML: at 07:15

## 2021-01-14 RX ADMIN — PROPOFOL 150 MG: 10 INJECTION, EMULSION INTRAVENOUS at 07:28

## 2021-01-14 RX ADMIN — MIDAZOLAM HYDROCHLORIDE 1 MG: 2 INJECTION, SOLUTION INTRAMUSCULAR; INTRAVENOUS at 07:11

## 2021-01-14 ASSESSMENT — PULMONARY FUNCTION TESTS
PIF_VALUE: 34
PIF_VALUE: 33
PIF_VALUE: 34
PIF_VALUE: 11
PIF_VALUE: 32
PIF_VALUE: 24
PIF_VALUE: 33
PIF_VALUE: 23
PIF_VALUE: 34
PIF_VALUE: 34
PIF_VALUE: 33
PIF_VALUE: 32
PIF_VALUE: 23
PIF_VALUE: 23
PIF_VALUE: 24
PIF_VALUE: 34
PIF_VALUE: 12
PIF_VALUE: 34
PIF_VALUE: 11
PIF_VALUE: 35
PIF_VALUE: 28
PIF_VALUE: 24
PIF_VALUE: 29
PIF_VALUE: 34
PIF_VALUE: 35
PIF_VALUE: 23
PIF_VALUE: 34
PIF_VALUE: 0
PIF_VALUE: 34
PIF_VALUE: 34
PIF_VALUE: 1
PIF_VALUE: 34
PIF_VALUE: 0
PIF_VALUE: 34
PIF_VALUE: 35
PIF_VALUE: 14
PIF_VALUE: 34
PIF_VALUE: 34
PIF_VALUE: 24
PIF_VALUE: 34
PIF_VALUE: 33
PIF_VALUE: 34
PIF_VALUE: 12
PIF_VALUE: 24
PIF_VALUE: 12
PIF_VALUE: 34
PIF_VALUE: 34
PIF_VALUE: 24
PIF_VALUE: 34
PIF_VALUE: 34
PIF_VALUE: 33
PIF_VALUE: 27
PIF_VALUE: 22
PIF_VALUE: 11
PIF_VALUE: 12
PIF_VALUE: 34
PIF_VALUE: 22
PIF_VALUE: 35
PIF_VALUE: 12
PIF_VALUE: 24
PIF_VALUE: 24
PIF_VALUE: 23
PIF_VALUE: 33
PIF_VALUE: 34
PIF_VALUE: 34
PIF_VALUE: 24
PIF_VALUE: 24
PIF_VALUE: 34
PIF_VALUE: 23
PIF_VALUE: 31
PIF_VALUE: 23
PIF_VALUE: 34
PIF_VALUE: 34
PIF_VALUE: 28

## 2021-01-14 ASSESSMENT — PAIN DESCRIPTION - DESCRIPTORS
DESCRIPTORS: PRESSURE
DESCRIPTORS: PRESSURE

## 2021-01-14 ASSESSMENT — PAIN SCALES - GENERAL
PAINLEVEL_OUTOF10: 2
PAINLEVEL_OUTOF10: 5
PAINLEVEL_OUTOF10: 2
PAINLEVEL_OUTOF10: 2

## 2021-01-14 ASSESSMENT — PAIN DESCRIPTION - LOCATION
LOCATION: ABDOMEN
LOCATION: ABDOMEN

## 2021-01-14 ASSESSMENT — PAIN DESCRIPTION - ORIENTATION
ORIENTATION: LOWER
ORIENTATION: LOWER

## 2021-01-14 ASSESSMENT — PAIN DESCRIPTION - PAIN TYPE: TYPE: SURGICAL PAIN

## 2021-01-14 NOTE — ANESTHESIA PROCEDURE NOTES
Peripheral Block    Patient location during procedure: pre-op  Start time: 1/14/2021 7:15 AM  End time: 1/14/2021 7:20 AM  Staffing  Performed: anesthesiologist   Anesthesiologist: Kannan Austin MD  Preanesthetic Checklist  Completed: patient identified, IV checked, site marked, risks and benefits discussed, surgical consent, monitors and equipment checked, pre-op evaluation, timeout performed, anesthesia consent given, oxygen available and patient being monitored  Peripheral Block  Patient position: supine  Prep: ChloraPrep  Patient monitoring: cardiac monitor, continuous pulse ox, frequent blood pressure checks and IV access  Block type: TAP and Rectus sheath  Laterality: bilateral  Injection technique: single-shot  Guidance: ultrasound guided  Local infiltration: lidocaine  Infiltration strength: 1 %  Dose: 3 mL  Provider prep: mask and sterile gloves  Local infiltration: lidocaine  Needle  Needle type: short-bevel   Needle gauge: 21 G  Needle length: 10 cm  Needle localization: ultrasound guidance  Needle insertion depth: 3 cm  Test dose: negative  Assessment  Injection assessment: negative aspiration for heme, no paresthesia on injection and local visualized surrounding nerve on ultrasound  Paresthesia pain: none  Slow fractionated injection: yes  Hemodynamics: stable  Additional Notes  U/S [id number].  (1) Under ultrasound guidance, a 21 gauge needle was inserted and placed in close proximity to the abdominal nerve.  (2) Ultrasound was also used to visualize the spread of the anesthetic in close proximity to the nerve being blocked. (3) The nerve appeared anatomically normal, and (4 there were no apparent abnormal pathological findings on the image that were readily visible and related to the nerve being blocked. (5) A permanent ultrasound image was saved in the patient's record.         Medications Administered  Bupivacaine (MARCAINE) PF injection 0.5%, 40 mL

## 2021-01-14 NOTE — ANESTHESIA PRE PROCEDURE
Department of Anesthesiology  Preprocedure Note       Name:  Reed Goodman   Age:  45 y.o.  :  1980                                          MRN:  [id number]         Date:  2021      Surgeon: Fabiana Crane):  Esther Juarez MD    Procedure: Procedure(s):  XI ROBOTIC LAPAROSCOPIC TOTAL HYSTERECTOMY, BILATERAL SALPINGECTOMY, CYSTO    Medications prior to admission:   Prior to Admission medications    Not on File       Current medications:    Current Facility-Administered Medications   Medication Dose Route Frequency Provider Last Rate Last Admin    ceFAZolin (ANCEF) 3 g in dextrose 5 % 100 mL IVPB  3 g Intravenous Once Esther Juarez MD        scopolamine (TRANSDERM-SCOP) transdermal patch 1 patch  1 patch Transdermal Once Esther Juarez MD        lactated ringers infusion   Intravenous Continuous Abdirashid Mcgraw MD           Allergies:  No Known Allergies    Problem List:  There is no problem list on file for this patient.       Past Medical History:        Diagnosis Date    Abnormal Pap smear of cervix     Menorrhagia     Personal history of kidney stones        Past Surgical History:        Procedure Laterality Date     SECTION  2013     SECTION      CHOLECYSTECTOMY  2009    LEEP  2006    LITHOTRIPSY  2008    X 2    TUBAL LIGATION         Social History:    Social History     Tobacco Use    Smoking status: Current Every Day Smoker     Packs/day: 0.50     Years: 20.00     Pack years: 10.00     Types: Cigarettes    Smokeless tobacco: Never Used   Substance Use Topics    Alcohol use: Yes     Comment: social                                 Ready to quit: Not Answered  Counseling given: Not Answered      Vital Signs (Current):   Vitals:    21 1211 21 0622   Weight: 230 lb (104.3 kg) 230 lb (104.3 kg)   Height: 5' 3\" (1.6 m) 5' 3\" (1.6 m)                                              BP Readings from Last 3 Encounters:   20 119/83 09/22/20 107/79       NPO Status: Time of last liquid consumption: 2330                        Time of last solid consumption: 2100                        Date of last liquid consumption: 01/13/21                        Date of last solid food consumption: 01/13/21    BMI:   Wt Readings from Last 3 Encounters:   01/14/21 230 lb (104.3 kg)   11/09/20 226 lb 4 oz (102.6 kg)   09/22/20 223 lb (101.2 kg)     Body mass index is 40.74 kg/m². CBC:   Lab Results   Component Value Date    WBC 9.2 09/22/2020    RBC 4.65 09/22/2020    HGB 13.6 09/22/2020    HCT 43.9 09/22/2020    MCV 94.4 09/22/2020    RDW 13.2 09/22/2020    PLT See Reflexed IPF Result 09/22/2020       CMP: No results found for: NA, K, CL, CO2, BUN, CREATININE, GFRAA, AGRATIO, LABGLOM, GLUCOSE, PROT, CALCIUM, BILITOT, ALKPHOS, AST, ALT    POC Tests: No results for input(s): POCGLU, POCNA, POCK, POCCL, POCBUN, POCHEMO, POCHCT in the last 72 hours.     Coags: No results found for: PROTIME, INR, APTT    HCG (If Applicable):   Lab Results   Component Value Date    HCG NEGATIVE 01/14/2021        ABGs: No results found for: PHART, PO2ART, AKJ3HIP, VDQ7MBR, BEART, P0VILKSC     Type & Screen (If Applicable):  No results found for: LABABO, LABRH    Drug/Infectious Status (If Applicable):  No results found for: HIV, HEPCAB    COVID-19 Screening (If Applicable):   Lab Results   Component Value Date    COVID19 Not Detected 01/10/2021         Anesthesia Evaluation  Patient summary reviewed no history of anesthetic complications:   Airway: Mallampati: II  TM distance: >3 FB   Neck ROM: full  Mouth opening: > = 3 FB Dental:          Pulmonary:Negative Pulmonary ROS and normal exam                               Cardiovascular:Negative CV ROS            Rhythm: regular  Rate: normal                    Neuro/Psych:   Negative Neuro/Psych ROS              GI/Hepatic/Renal: Neg GI/Hepatic/Renal ROS            Endo/Other: Negative Endo/Other ROS                    Abdominal: (+) obese,         Vascular: negative vascular ROS. Anesthesia Plan      general and regional     ASA 2       Induction: intravenous. Anesthetic plan and risks discussed with patient. Use of blood products discussed with patient whom consented to blood products. Plan discussed with CRNA.                   Vida Kline MD   1/14/2021

## 2021-01-14 NOTE — BRIEF OP NOTE
Brief Operative Note  Department of Obstetrics and Gynecology  [address] [facility]     Patient: Reed Goodman   : 1980  MRN: [id number]       Acct: [de-identified]   Date of Procedure: 21     Pre-operative Diagnosis: 45 y.o. female [id number]    Abnormal Uterine Bleeding   Hx  x2   Hx bilateral tubal ligation   Hx LEEP ()   Hx cholecystectomy    Obesity (BMI 40.74)    Post-operative Diagnosis: Same as above; Significant anterior abdominal wall adhesions     Procedure: Robotic Assisted Laparoscopic Hysterectomy with Bilateral Salpingectomy and Cystoscopy and Lysis of Anterior Abdominal wall Adhesions     Surgeon: Dr. Kriss Arteaga MD     Assistant(s): Dr. Niurka Serna; Santi Kirk, PGY-4; Galileo Schwartz, PGY-1    Anesthesia: general w/ pre-operative TAP block    Findings: On bimanual exam 9 cm uterus, anteverted with no adnexal fullness bilaterally. Normal external genitalia without lesions. Normal vaginal mucosa without prolapse. Normal appearing cervix without bleeding or lesions. Normal appearing uterus, bilateral ovaries and fallopian tubes. 2 Filshie clips noted on each fallopian tube, significant bowel and omental adhesions to anterior abdominal wall. Cystoscopy showed normal appearing bladder epithelium with no evidence of trauma, bilateral ureteral jets visualized. Total IV fluids/Blood products:  1300 ml  Urine Output:  150 ml    Estimated blood loss:  20ml  Drains:  none  Specimens:  Uterus, cervix and bilateral fallopian tubes w/ Filshie clips   Instrument and Sponge Count: Correct  Complications:  none  Condition:  stable, transferred to post anesthesia recovery    See full operative report for further details.       Santi Kirk DO  Ob/Gyn Resident  Pager: [phone]  2021, 10:25 AM

## 2021-01-14 NOTE — H&P
OB/GYN Pre-Op H&P  [facility]    Patient Name: Anton White     Patient : 1980  Room/Bed: STVZ OR POOL RM/NONE  Admission Date/Time: 2021  5:38 AM  Primary Care Physician: No primary care provider on file. MRN: [id number]    Date: 2021  Time: 7:09 AM    The patient was seen in pre-op holding. She is here for Robotic assisted laparoscopic hysterectomy, bilateral salpingectomy, cystoscopy. Starlett Hashimoto is a 36 y/o female w/ a Hx of menorrhagia w/ regular cycle, C/S x2, and bilateral tubal ligation. Patient states her periods occurs at regular intervals but complains of periods increasing in length, now up to 7-9 days. She reports heavy bleeding with clots and pain. Patient counseled on medical vs. Surgical management. Patient desires definitive surgical management for her abnormal uterine bleeding. Pre-op CBC and TSH wnl. Pap w/ co-testing performed on 20 which was completely benign. EMB performed on 20 which was negative for any atypia or malignancy. The procedure risks and complications were reviewed. The labs, Consent, and H&P were reviewed and updated. The patient was counseled on the possibility of  the need of a second surgery. The patient voiced understanding and had all of her questions answered. The possibility of incomplete removal of abnormal tissue was discussed. OBSTETRICAL HISTORY:   OB History    Para Term  AB Living   4 0 0 0 2 2   SAB TAB Ectopic Molar Multiple Live Births   2 0 0 0 0 0      # Outcome Date GA Lbr Mikal/2nd Weight Sex Delivery Anes PTL Lv   4       CS-LTranv      3       CS-LTranv      2 SAB            1 SAB                PAST MEDICAL HISTORY:   has a past medical history of Abnormal Pap smear of cervix, Menorrhagia, and Personal history of kidney stones. PAST SURGICAL HISTORY:   has a past surgical history that includes LEEP ();  section ();   section (); Tubal ligation (2015); Cholecystectomy (2009); and Lithotripsy (2008). ALLERGIES:  Allergies as of 11/10/2020    (No Known Allergies)       MEDICATIONS:  Current Facility-Administered Medications   Medication Dose Route Frequency Provider Last Rate Last Admin    ceFAZolin (ANCEF) 3 g in dextrose 5 % 100 mL IVPB  3 g Intravenous Once Greg Buitrago MD        scopolamine (TRANSDERM-SCOP) transdermal patch 1 patch  1 patch Transdermal Once Greg Buitrago MD   1 patch at 01/14/21 0645    lactated ringers infusion   Intravenous Continuous Giana Elias  mL/hr at 01/14/21 0634 New Bag at 01/14/21 0634    fentaNYL (SUBLIMAZE) injection 25 mcg  25 mcg Intravenous Q5 Min PRN Stephanie Millan MD        lactated ringers infusion   Intravenous Continuous Stephanie Millan MD        sodium chloride flush 0.9 % injection 10 mL  10 mL Intravenous 2 times per day Ziggy Driver MD        sodium chloride flush 0.9 % injection 10 mL  10 mL Intravenous PRN Ziggy Driver MD        lidocaine PF 1 % injection 1 mL  1 mL Intradermal Once PRN Stephanie Millan MD        midazolam PF (VERSED) injection 1 mg  1 mg Intravenous Q10 Min PRN Stephanie Millan MD        bupivacaine (PF) (MARCAINE) 0.5 % injection 150 mg  30 mL Infiltration Once Ziggy Driver MD        midazolam PF (VERSED) injection 1 mg  1 mg Intravenous Once Ziggy Driver MD        fentaNYL (SUBLIMAZE) injection 50 mcg  50 mcg Intravenous Once Ziggy Driver MD        midazolam (VERSED) 2 MG/2ML injection             fentaNYL (SUBLIMAZE) 100 MCG/2ML injection                FAMILY HISTORY:  family history includes No Known Problems in her father and mother. SOCIAL HISTORY:   reports that she has been smoking cigarettes. She has a 10.00 pack-year smoking history. She has never used smokeless tobacco. She reports current alcohol use. She reports that she does not use drugs.     VITALS:  Vitals:    01/11/21 1211 01/14/21 0622 01/14/21 3156 BP: 98/61   Pulse:   71   Resp:   20   Temp:   97 °F (36.1 °C)   TempSrc:   Temporal   SpO2:   100%   Weight: 230 lb (104.3 kg) 230 lb (104.3 kg)    Height: 5' 3\" (1.6 m) 5' 3\" (1.6 m)                                                                                                                                PHYSICAL EXAM:     Unchanged from Prior H&P  CONSTITUTIONAL:  Alert and oriented, no acute distress  HEAD: normocephalic, atraumatic  EYES: Pupils equal and reactive to light, Extraocular muscles intact, sclera non icteric  ENT: Mucus membranes moist, No otorrhea, no rhinorrhea  NECK:  supple, symmetrical, trachea midline   LUNGS:  Good air movement bilaterally, unlabored respirations, no wheezes or rhonchi  CARDIOVASCULAR: Regular rate and rhythm, no murmurs rubs or gallops  ABDOMEN: soft, non tender, non distended, no rebound or guarding, no hernias, no hepatomegaly, no splenomegly  MUSCULOSKELETAL:  Equal strength bilaterally, normal muscle tone  SKIN: No abscess or rash  NEUROLOGIC:  Cranial nerves 2-12 grossly intact, no focal deficits  PSYCH: affect appropriate  Pelvic Exam: deferred to OR      LAB RESULTS:  Admission on 01/14/2021   Component Date Value Ref Range Status    WBC 01/14/2021 10.4  3.5 - 11.3 k/uL Final    RBC 01/14/2021 4.13  3.95 - 5.11 m/uL Final    Hemoglobin 01/14/2021 12.3  11.9 - 15.1 g/dL Final    Hematocrit 01/14/2021 40.8  36.3 - 47.1 % Final    MCV 01/14/2021 98.8  82.6 - 102.9 fL Final    MCH 01/14/2021 29.8  25.2 - 33.5 pg Final    MCHC 01/14/2021 30.1  28.4 - 34.8 g/dL Final    RDW 01/14/2021 13.2  11.8 - 14.4 % Final    Platelets 13/17/3983 154  138 - 453 k/uL Final    MPV 01/14/2021 12.5  8.1 - 13.5 fL Final    NRBC Automated 01/14/2021 0.0  0.0 per 100 WBC Final    HCG, Pregnancy Urine (POC) 01/14/2021 NEGATIVE  NEGATIVE Final    Comment: Specimens with hCG levels near the threshold of the test (25 mIU/mL) may give a negative or   indeterminate result.   In such cases, another test should be performed with a new specimen   in 48-72 hours. If early pregnancy is suspected clinically in this setting, correlation   with quantitative serum b-hCG level is suggested. Hospital Outpatient Visit on 01/10/2021   Component Date Value Ref Range Status    SARS-CoV-2 01/10/2021        Final    SARS-CoV-2, Rapid 01/10/2021        Final    Source 01/10/2021 . NASOPHARYNGEAL SWAB   Final    SARS-CoV-2 01/10/2021 Not Detected  Not Detected Final    Comment:       The specimen is NEGATIVE for SARS-CoV-2, the novel coronavirus associated with COVID-19. A negative result does not rule out COVID-19. Salo SARS-CoV-2 for use on the Salo [employer]0/88[employer] Systems is a real-time RT-PCR test intended   for the qualitative detection of nucleic acids from SARS-CoV-2  in clinician-collected nasal, nasopharyngeal, and oropharyngeal swab specimens from   individuals who meet COVID-19 clinical and/or epidemiological criteria. Salo SARS-CoV-2 is for use only under Emergency Use Authorization (EUA) in laboratories   certified under [address] (CLIA), 42 U. S.C. §128B,   that meet requirements to perform high or moderate complexity tests. An individual without symptoms of COVID-19 and who is not shedding SARS-CoV-2 virus would   expect to have a negative (not detected) result in this assay.   Fact sheet for Healthcare Providers: [place]  Fact sheet for Patients: https://www.                           fda.gov/media/629780/download        METHODOLOGY: RT-PCR         DIAGNOSTICS:  Surgical Pathology Report 11/09/2020 10:20  [address]   -- Diagnosis --     Endometrial biopsy:          Secretory endometrium.       MIGUEL Joaquin   **Electronically Signed Out**         rdd/11/11/2020         Clinical Information   Pre-op Diagnosis:  MENORRHAGIA WITH REGULAR CYCLE     Operative Findings:  EMB negative high-risk HPV result does not exclude the   presence of other high-risk HPV types, the possibility of future   cytologic abnormalities, underlying CIN2-3 or cancer. This test is intended for medical purposes only and is not valid for   the evaluation of suspected sexual abuse or for other forensic   purposes.  HPV testing should not be used for screening or management   of atypical squamous cells of undetermined significance (ASCUS) in   women under age 21. NOTE: HPV Type 16 and Other for Vaginal specimens only   The specimen submitted for testing did not meet [employer] submission   guidelines.  Testing was performed on a specimen that did not meet   validated specimen type requirements.  Performance characteristics of   this assay may be affected.  Interpret results with caution.  Please   refer to the [address] [place] [address] Laboratory Test Directory for information on   specimen acceptability: [email or web address] Performed By: Shon Butler [address]   [place], [facility]   : Genet Thompson. Marbella Carter MD          DIAGNOSIS & PLAN:  1. Menorrhagia w/ regular cycle   - Proceed with planned procedure: Robotic assisted laparoscopic hysterectomy. Bilateral salpingectomy. Cystoscopy. - Consent signed, on chart. - The patient is ready for transport to the operative suite. Counseling: The patient was counseled on all options both medical and surgical, conservative as well as definitive. She has elected to proceed with the procedure as stated above. The patient was counseled on the procedure. Risks and complications were reviewed in detail. The patients orders, labs, consents have been completed. The history and physical as well as all supporting surgical documentation will be forwarded to the pre-operative holding area. The patient is aware that this procedure may not alleviate her symptoms.  That there may be a necessity for a second surgery and that there may be an incomplete removal of abnormal tissue.     Savage Jacobs DO   Ob/Gyn Resident  Pager: [phone]  [facility], [address]  1/14/2021, 7:09 AM

## 2021-01-14 NOTE — PROGRESS NOTES
[phone] Dr Amy Goodson to the bedside, time out performed, Pt monitored, 02, Maury Tap nerve block completed using Bupivacaine, 0.5% 20 ml to each side,  pt tolerated procedure well,  Site CDI, (see charting) vss. Versed Given: 2 mg  Fentanyl  50 mcg, pt denies co pain or discomfort, sister back to the bedsode.

## 2021-01-14 NOTE — OP NOTE
were mobilized out of the posterior cul-de-sac and then the daVinci robot was brought in and docked. Instruments were placed under direct visualization. With the physician at the console, visualization of the pelvis was undergone with findings noted below. The attention was turned to the left adnexa and the left ureter was noted to be peristalsing well below the surgical site. The left fallopian tube was cauterized and and cut from the fimbriated end to the tubo-ovarian ligament and remained on the uterus. The utero-ovarian ligament was cauterized and ligated. Then the broad ligament was opened using bipolar and scissors then dissected down to the round ligament which was cauterized and ligated. The anterior leaf of the broad ligament was then dissected to the vesicouterine peritoneal reflection and the bladder flap was created. The bladder was mobilized inferiorly and then attention was turned to the right adnexa. Similarly, the right ureter was noted to be peristalsing well below the surgical site. The right fallopian tube was cauterized and excised from the fimbriated end to the tubo-ovarian ligament and remained on the uterus. The utero-ovarian ligament was cauterized and ligated. Then the broad ligament was opened using bipolar and scissors then dissected down to the round ligament which was cauterized and ligated. The anterior leaf of the broad ligament was further dissected to the vesicouterine peritoneal reflection to complete the bladder flap. The bladder was further mobilized inferiorly and the anterior colpotomy cup could be palpated and visualized. The right uterine vessels were then skeletonized, cauterized and ligated at the cervical isthmus and a subsequent bite was taken, parallel to the cervix at the cardinal ligament.   Then in a similar fashion, the left uterine arteries were skeletonized, cauterized and ligated at the cervical isthmus and a subsequent bite was taken, parallel to the cervix at the cardinal ligament. Posterior colpotomy was made circumferentially to the anterior aspect, amputating the cervix from the vagina without any complications. Then the uterus and cervix were removed en bloc vaginally without any complications. The vaginal cuff was then closed from right to left using interrupted sutures of 0 vicryl, being careful to incorporate both uterosacral ligaments in the corners and the vaginal mucosa into each suture. Excellent hemostasis was noted over the vaginal cuff, which was palpated vaginally after removing the glove with laparotomy sponge. Simultaneously, the cuff was also visualized laparoscopically and noted to be intact and well approximated. All pedicles were inspected with excellent hemostasis noted. There was no other intra-abdominal pathology. A cystoscope was then inserted into the urethra and bilateral ureteral jets were noted at the insertion of the ureters to the trigone of the bladder. A full inspection of the bladder showed no sutures in the bladder. The cytoscope was then removed, and the bean catheter was reinserted. All instruments were removed from the abdomen under direct visualization. The daVinci was undocked and removed from the operative field. All CO2 was removed from the patient's abdomen and trocars were removed. Skin was closed using 4-0 monocryl in the subcuticular fashion and covered with dermabond. Sponge, lap, needle count, and instrument counts were correct x 2. The patient was extubated and taken to the post operative recovery unit in good condition. The patient received postopertaive IV Toradol for further analgesia. Dr. Wilfredo Escalona was present for the entire operation.     Findings: Normal appearing external genitalia without lesions, approximately  9 week sized anteverted uterus w/ no adnexal fullness bilaterally, normal appearing vaginal mucosa withot prolapse, normal appearing cervix without bleeding or lesions,

## 2021-01-14 NOTE — ANESTHESIA POSTPROCEDURE EVALUATION
Department of Anesthesiology  Postprocedure Note    Patient: Tri Tafoya  MRN: [id number]  YOB: 1980  Date of evaluation: 1/14/2021  Time:  10:26 AM     Procedure Summary     Date: 01/14/21 Room / Location: [address]    Anesthesia Start: 0725 Anesthesia Stop: 1013    Procedure: XI ROBOTIC LAPAROSCOPIC TOTAL HYSTERECTOMY, BILATERAL SALPINGECTOMY, CYSTO. LYSIS OF ADHESIONS  (N/A ) Diagnosis: (MENORRHAGIA)    Surgeons: Sandeep Cruz MD Responsible Provider: Jose Luis Parham MD    Anesthesia Type: general, regional ASA Status: 2          Anesthesia Type: general, regional    Niles Phase I: Niles Score: 8    Niles Phase II:      Last vitals: Reviewed and per EMR flowsheets.        Anesthesia Post Evaluation    Patient location during evaluation: PACU  Patient participation: complete - patient participated  Level of consciousness: awake and alert  Pain score: 3  Airway patency: patent  Nausea & Vomiting: no vomiting and no nausea  Complications: no  Cardiovascular status: hemodynamically stable  Respiratory status: acceptable  Hydration status: stable

## 2021-01-15 LAB — SURGICAL PATHOLOGY REPORT: NORMAL

## 2021-01-28 ENCOUNTER — TELEPHONE (OUTPATIENT)
Dept: OBGYN CLINIC | Age: 41
End: 2021-01-28

## 2021-01-28 NOTE — PROGRESS NOTES
[facility]  [facility] [place] Rush  [address] [facility]  Dept: Jonah Causey  1/29/2021  10:14 AM      Luis Dubois  Procedure: MATEUSZ SALAZAR, extensive LACEY, cysto      Luis Dubois is a 45 y.o. female [id number]      The patient was seen, she denies any complaints. She denied any shortness of breath, chest pain or dizziness. She denied any nausea, vomiting, or diarrhea. There is no fever, chills, or rigors. The patient denies any vaginal bleeding, discharge or odor. All of her pre-operative complaints are now resolved. Blood pressure 121/82, pulse 93, height 5' 3\" (1.6 m), weight 230 lb 8 oz (104.6 kg), last menstrual period 12/14/2020. Abdominal Exam: soft non-tender. Good bowel sounds. No guarding, rebound or rigidity. No costal vertebral angle tenderness bilateral. No hernias    Incisions: healing well, no drainage, no erythema    Extremities: No edema or calf pain noted bilaterally. Pelvic Exam:Exam deferred. Results for orders placed or performed during the hospital encounter of 01/14/21   CBC   Result Value Ref Range    WBC 10.4 3.5 - 11.3 k/uL    RBC 4.13 3.95 - 5.11 m/uL    Hemoglobin 12.3 11.9 - 15.1 g/dL    Hematocrit 40.8 36.3 - 47.1 %    MCV 98.8 82.6 - 102.9 fL    MCH 29.8 25.2 - 33.5 pg    MCHC 30.1 28.4 - 34.8 g/dL    RDW 13.2 11.8 - 14.4 %    Platelets 872 626 - 839 k/uL    MPV 12.5 8.1 - 13.5 fL    NRBC Automated 0.0 0.0 per 100 WBC   Surgical Pathology   Result Value Ref Range    Surgical Pathology Report       -- Diagnosis --  CERVIX AND UTERUS WITH BILATERAL FALLOPIAN TUBES, HYSTERECTOMY WITH  BILATERAL SALPINGECTOMY:  CERVIX:- MILD CHRONIC CERVICITIS WITH FOCAL SQUAMOUS METAPLASIA. UTERUS:- BENIGN SECRETORY PHASE ENDOMETRIUM.- NO MYOMETRIAL OR SEROSAL  LESION IS IDENTIFIED. BILATERAL FALLOPIAN TUBES:- STATUS POST BILATERAL TUBAL LIGATIONS.-  OTHERWISE UNREMARKABLE FALLOPIAN TUBES. Ariana Presley. Jenni Galeazzi,  **Electronically Signed Out**         sls/1/15/2021       Clinical Information  Pre-op Diagnosis:  MENORRHAGIA   Operative Findings:  UTERUS, CERVIX, BILATERAL FALLOPIAN TUBES  Operation Performed:  XI ROBOTIC LAPAROSCOPIC TOTAL HYSTERECTOMY,  BILATERAL SALPINGECTOMY, CYSTO, LYSIS OF ADHESIONS    Source of Specimen  1: UTERUS, CERVIX, BILATERAL FALLOPIAN TUBES    Gross Description  \"NAZIA RAO, UTERUS, CERVIX, BILATERAL FALLOPIAN TUBES\" Uterus  with attached cervix and bilateral fimbriated fallopian tube segments. Dimensions:  Uterus and cervix 10.1 x 6.2 x 4.2 cm. Weight:   Uterus and cervix 84 grams. Serosa:  Pink-tan. Cervix:  5.0 x 4.2 x 3.9 cm, unremarkable with a patent os. Endometrium:  The cavity is 6.5 x 2.2 cm with a pink-tan surface and  ranges in thickness from 0.1 to 0.3 cm. Myometrium:  The myometrium has a maximum thickness of 1.9 cm and is  finely trabecular with no masses. Tubes: The left fimbriated fallopian tube segment is 6.5 cm long x  0.4 cm in diameter and two Filshie clips are present. The lumen away  from the clips is unremarkable. The right fimbriated fallopian tube  segment is 6.5 cm long x 0.4 cm in diameter with a pink-tan serosa and  two Filshie clips are present. Sectioning reveals an unremarkable  lumen away from the clips. Cassette summary:  \"A\" anterior cervix, \"B\" posterior cervix, \"C-D\"  anterior endomyometrium full thickness sections, \"E-F\" posterior  endomyometrium full thickness sections, \"G-H\" left tube in entirety,  \"I-J\" right tube in entirety. tm      Microscopic Description  Microscopic examination pe rformed. SURGICAL PATHOLOGY CONSULTATION       Patient Name: Jacinta Sanford: [id number]  Path Number: [id number]    [address] [place] [address],  [place] [address].   [facility],  [address]  [phone]  Fax: [phone]     POCT urine pregnancy Result Value Ref Range    HCG, Pregnancy Urine (POC) NEGATIVE NEGATIVE   TYPE AND SCREEN   Result Value Ref Range    Expiration Date 01/17/2021,2359     Arm Band Number [id number]     ABO/Rh A NEGATIVE     Antibody Screen NEGATIVE            Assessment:      Diagnosis Orders   1. Postop check     2. S/P laparoscopic hysterectomy          Patient Active Problem List    Diagnosis Date Noted    S/P MATEUSZ SALAZAR, LACEY w/ Cysto 1/14/21 01/14/2021    Dysmenorrhea     Post-op pain           POD# 15   Procedure: MATEUSZ SALAZAR with cysto   Stable   Pathology reviewed and found to be benign. Yes    Plan:   Return in about 4 weeks (around 2/26/2021) for postop. Continue with restrictions. Pelvic rest. No lifting or intercourse. No baths or pools. No douching or tampons.     Alec Gao MD

## 2021-01-29 ENCOUNTER — OFFICE VISIT (OUTPATIENT)
Dept: OBGYN CLINIC | Age: 41
End: 2021-01-29

## 2021-01-29 VITALS
HEART RATE: 93 BPM | HEIGHT: 63 IN | BODY MASS INDEX: 40.84 KG/M2 | DIASTOLIC BLOOD PRESSURE: 82 MMHG | SYSTOLIC BLOOD PRESSURE: 121 MMHG | WEIGHT: 230.5 LBS

## 2021-01-29 DIAGNOSIS — Z09 POSTOP CHECK: Primary | ICD-10-CM

## 2021-01-29 DIAGNOSIS — Z90.710 S/P LAPAROSCOPIC HYSTERECTOMY: ICD-10-CM

## 2021-01-29 PROCEDURE — 99024 POSTOP FOLLOW-UP VISIT: CPT | Performed by: OBSTETRICS & GYNECOLOGY

## 2021-02-28 NOTE — PROGRESS NOTES
[facility] PHYSICIANS  [facility] - [address]  Dept: Jonah Winters  3/1/2021  9:16 AM      Faiza Livingston  Procedure: Alyssa CHILD, cysto      Faiza Livingston is a 45 y.o. female [id number]      The patient was seen, she denies any complaints. She denied any shortness of breath, chest pain or dizziness. She denied any nausea, vomiting, or diarrhea. There is no fever, chills, or rigors. The patient denies any vaginal bleeding, discharge or odor. All of her pre-operative complaints are now resolved. Blood pressure 130/81, pulse 81, temperature 97.2 °F (36.2 °C), height 5' 3\" (1.6 m), weight 233 lb 3.2 oz (105.8 kg), last menstrual period 12/14/2020. Chaperone for Intimate Exam  ? Chaperone was offered and accepted as part of the rooming process. ? Chaperone: Ernestina Donaldson        Abdominal Exam: soft non-tender. Good bowel sounds. No guarding, rebound or rigidity. No costal vertebral angle tenderness bilateral. No hernias    Incision: healing well, no drainage, no erythema    Extremities: No edema or calf pain noted bilaterally. Pelvic Exam: Normal appearing vulva and vagina. Vaginal cuff healing well without any defect.   No tenderness with bimanual exam.        Results for orders placed or performed during the hospital encounter of 01/14/21   CBC   Result Value Ref Range    WBC 10.4 3.5 - 11.3 k/uL    RBC 4.13 3.95 - 5.11 m/uL    Hemoglobin 12.3 11.9 - 15.1 g/dL    Hematocrit 40.8 36.3 - 47.1 %    MCV 98.8 82.6 - 102.9 fL    MCH 29.8 25.2 - 33.5 pg    MCHC 30.1 28.4 - 34.8 g/dL    RDW 13.2 11.8 - 14.4 %    Platelets 558 438 - 744 k/uL    MPV 12.5 8.1 - 13.5 fL    NRBC Automated 0.0 0.0 per 100 WBC   Surgical Pathology   Result Value Ref Range    Surgical Pathology Report       -- Diagnosis --  CERVIX AND UTERUS WITH BILATERAL FALLOPIAN TUBES, HYSTERECTOMY WITH  BILATERAL SALPINGECTOMY: CERVIX:- MILD CHRONIC CERVICITIS WITH FOCAL SQUAMOUS METAPLASIA. UTERUS:- BENIGN SECRETORY PHASE ENDOMETRIUM.- NO MYOMETRIAL OR SEROSAL  LESION IS IDENTIFIED. BILATERAL FALLOPIAN TUBES:- STATUS POST BILATERAL TUBAL LIGATIONS.-  OTHERWISE UNREMARKABLE FALLOPIAN TUBES. Laura Mariee. Wyatt Osborn,  **Electronically Signed Out**         sls/1/15/2021       Clinical Information  Pre-op Diagnosis:  MENORRHAGIA   Operative Findings:  UTERUS, CERVIX, BILATERAL FALLOPIAN TUBES  Operation Performed:  XI ROBOTIC LAPAROSCOPIC TOTAL HYSTERECTOMY,  BILATERAL SALPINGECTOMY, CYSTO, LYSIS OF ADHESIONS    Source of Specimen  1: UTERUS, CERVIX, BILATERAL FALLOPIAN TUBES    Gross Description  \"NAZIA RAO, UTERUS, CERVIX, BILATERAL FALLOPIAN TUBES\" Uterus  with attached cervix and bilateral fimbriated fallopian tube segments. Dimensions:  Uterus and cervix 10.1 x 6.2 x 4.2 cm. Weight:   Uterus and cervix 84 grams. Serosa:  Pink-tan. Cervix:  5.0 x 4.2 x 3.9 cm, unremarkable with a patent os. Endometrium:  The cavity is 6.5 x 2.2 cm with a pink-tan surface and  ranges in thickness from 0.1 to 0.3 cm. Myometrium:  The myometrium has a maximum thickness of 1.9 cm and is  finely trabecular with no masses. Tubes: The left fimbriated fallopian tube segment is 6.5 cm long x  0.4 cm in diameter and two Filshie clips are present. The lumen away  from the clips is unremarkable. The right fimbriated fallopian tube  segment is 6.5 cm long x 0.4 cm in diameter with a pink-tan serosa and  two Filshie clips are present. Sectioning reveals an unremarkable  lumen away from the clips. Cassette summary:  \"A\" anterior cervix, \"B\" posterior cervix, \"C-D\"  anterior endomyometrium full thickness sections, \"E-F\" posterior  endomyometrium full thickness sections, \"G-H\" left tube in entirety,  \"I-J\" right tube in entirety. tm      Microscopic Description  Microscopic examination pe rformed.      SURGICAL PATHOLOGY CONSULTATION Patient Name: Hugo Fuentes: [id number]  Path Number: [id number]    [address],  [address]. [place], [address]  [phone]  Fax: [phone]     POCT urine pregnancy   Result Value Ref Range    HCG, Pregnancy Urine (POC) NEGATIVE NEGATIVE   TYPE AND SCREEN   Result Value Ref Range    Expiration Date 01/17/2021,2359     Arm Band Number BE [id number]     ABO/Rh A NEGATIVE     Antibody Screen NEGATIVE            Assessment:      Diagnosis Orders   1. Postop check     2. S/P hysterectomy          Patient Active Problem List    Diagnosis Date Noted    S/P MATEUSZ SALAZAR, LACEY w/ Cysto 1/14/21 01/14/2021    Dysmenorrhea     Post-op pain           POD# 6 wks   Procedure: MATEUSZ SALAZAR, cystelio   Stable   Pathology reviewed and found to be benign. Yes    Plan:   Return in about 7 months (around 9/24/2021).       Shekhar Stovall MD

## 2021-03-01 ENCOUNTER — OFFICE VISIT (OUTPATIENT)
Dept: OBGYN CLINIC | Age: 41
End: 2021-03-01

## 2021-03-01 VITALS
HEART RATE: 81 BPM | BODY MASS INDEX: 41.32 KG/M2 | HEIGHT: 63 IN | TEMPERATURE: 97.2 F | SYSTOLIC BLOOD PRESSURE: 130 MMHG | DIASTOLIC BLOOD PRESSURE: 81 MMHG | WEIGHT: 233.2 LBS

## 2021-03-01 DIAGNOSIS — Z09 POSTOP CHECK: Primary | ICD-10-CM

## 2021-03-01 DIAGNOSIS — Z90.710 S/P HYSTERECTOMY: ICD-10-CM

## 2021-03-01 PROCEDURE — 99024 POSTOP FOLLOW-UP VISIT: CPT | Performed by: OBSTETRICS & GYNECOLOGY

## 2021-09-24 ENCOUNTER — OFFICE VISIT (OUTPATIENT)
Dept: OBGYN CLINIC | Age: 41
End: 2021-09-24
Payer: COMMERCIAL

## 2021-09-24 VITALS
BODY MASS INDEX: 42.16 KG/M2 | DIASTOLIC BLOOD PRESSURE: 72 MMHG | WEIGHT: 238 LBS | SYSTOLIC BLOOD PRESSURE: 109 MMHG | TEMPERATURE: 98.8 F | HEART RATE: 82 BPM

## 2021-09-24 DIAGNOSIS — Z12.31 ENCOUNTER FOR SCREENING MAMMOGRAM FOR MALIGNANT NEOPLASM OF BREAST: ICD-10-CM

## 2021-09-24 DIAGNOSIS — Z01.419 NORMAL GYNECOLOGIC EXAMINATION: Primary | ICD-10-CM

## 2021-09-24 PROCEDURE — 99396 PREV VISIT EST AGE 40-64: CPT | Performed by: OBSTETRICS & GYNECOLOGY

## 2021-09-24 ASSESSMENT — ENCOUNTER SYMPTOMS
ABDOMINAL PAIN: 0
SHORTNESS OF BREATH: 0
COUGH: 0
BACK PAIN: 0

## 2021-09-24 NOTE — PROGRESS NOTES
[facility] PHYSICIANS  [facility] - [address] [facility]  Dept: [phone]    Chief complaint:   Chief Complaint   Patient presents with    Annual Exam     last pap 2020neg hpv neg        History Present Illness: Roberto Carlos Ovalles is a 36 yo female who presents for her annual exam.  She had a hysterectomy 8 months ago. She is doing well and has no complaints. She is sexually active with her  and denies any dyspareunia. She denies any pelvic pain or vaginal discharge. She denies any bowel or bladder issues. Current Medications (OTC/Herbal):   Current Outpatient Medications   Medication Sig Dispense Refill    ondansetron (ZOFRAN ODT) 4 MG disintegrating tablet Take 1 tablet by mouth every 8 hours as needed for Nausea or Vomiting (Patient not taking: Reported on 2021) 15 tablet 0    ibuprofen (ADVIL;MOTRIN) 600 MG tablet Take 1 tablet by mouth every 6 hours as needed for Pain (Patient not taking: Reported on 2021) 60 tablet 0     No current facility-administered medications for this visit. Allergies: No Known Allergies  Past Medical History:   Past Medical History:   Diagnosis Date    Abnormal Pap smear of cervix     Menorrhagia     Personal history of kidney stones      Past Surgical History:   Past Surgical History:   Procedure Laterality Date     SECTION  2013     SECTION      CHOLECYSTECTOMY  2009    CYSTOSCOPY  2021    HYSTERECTOMY  2021    ROBOTIC LAPAROSCOPIC TOTAL HYSTERECTOMY, BILATERAL SALPINGECTOMY, CYSTO. LYSIS OF ADHESIONS      HYSTERECTOMY N/A 2021    XI ROBOTIC LAPAROSCOPIC TOTAL HYSTERECTOMY, BILATERAL SALPINGECTOMY, CYSTO.  LYSIS OF ADHESIONS  performed by Rubén Epstein MD at [address] [place] [address]  2006    LITHOTRIPSY  2008    X 2    TUBAL LIGATION       Obstetric History:   4  Para 2  Gynecologic History: LMP s/p hyst   Menarche 13    Last Pap: 21       Any history of abnormal paps yes    PriorColpo/Biopsy LEEP in 2006    Last Mammogram n/a  Contraception: s/p hyst  Complications: none  STDs: none  Psychosocial History: Occupation:      Caffeine Yes    At risk for depression No    Abuse:   No  Seatbelt:   Yes  Exercise:  No    Social History     Socioeconomic History    Marital status: Single     Spouse name: Not on file    Number of children: Not on file    Years of education: Not on file    Highest education level: Not on file   Occupational History    Not on file   Tobacco Use    Smoking status: Current Every Day Smoker     Packs/day: 0.50     Years: 20.00     Pack years: 10.00     Types: Cigarettes    Smokeless tobacco: Never Used   Vaping Use    Vaping Use: Former   Substance and Sexual Activity    Alcohol use: Yes     Comment: social     Drug use: Never    Sexual activity: Yes     Partners: Male   Other Topics Concern    Not on file   Social History Narrative    Not on file     Social Determinants of Health     Financial Resource Strain:     Difficulty of Paying Living Expenses:    Food Insecurity:     Worried About Running Out of Food in the Last Year:     [address] in the Last Year:    Transportation Needs:     Lack of Transportation (Medical):      Lack of Transportation (Non-Medical):    Physical Activity:     Days of Exercise per Week:     Minutes of Exercise per Session:    Stress:     Feeling of Stress :    Social Connections:     Frequency of Communication with Friends and Family:     Frequency of Social Gatherings with Friends and Family:     Attends [employer] Services:     Active Member of Clubs or Organizations:     Attends Club or Organization Meetings:     Marital Status:    Intimate Partner Violence:     Fear of Current or Ex-Partner:     Emotionally Abused:     Physically Abused:     Sexually Abused:        Family History   Problem Relation Age of Onset    No Known Problems Mother     No Known Problems Father        Review of Systems:   Review of Systems   Constitutional: Negative for chills and fever. HENT: Negative for congestion. Respiratory: Negative for cough and shortness of breath. Cardiovascular: Negative for chest pain and palpitations. Gastrointestinal: Negative for abdominal pain. Genitourinary: Negative for dyspareunia, pelvic pain and vaginal discharge. Musculoskeletal: Negative for back pain. Neurological: Negative for dizziness and light-headedness. Psychiatric/Behavioral: The patient is not nervous/anxious. Physical exam:  vitals:  Height   5  ft    3 in,  Weight    238 lbs,   109/72 BP  Gen: alert, no apparent distress  HEENT:No pathologic skin lesions noted,NC/AT,PERRL, normal midline nontender thyroid   Lung Exam: Clear to auscultation in all fields bilaterally, without wheezes,rales or rhonchi. Cardiac Exam: Normal sinus rhythm andrate, without murmurs, rubs or gallops appreciated. Breast Exam: Symmetric without pathological skin changes, nontender without discrete suspicious masses palpated, supraclavicular or axillary adenopathy or nipple discharge noted. Abdominal Exam: Nontender to deep palpation without organomegaly, masses or CVAT appreciated, BS positive. No spinal deformation or tenderness. External Genitalia: Normal development without vulvar,vaginal lesions noted. Normal vaginal discharge. Uterus and cervix surgically absent. Adnexa nontender without abnormal masses bilaterally. Rectal Exam: Omitted. Extremities: Nontender without clubbing, cyanosis or edema. F.R.O.M. Neurologic Exam: Grossly intact without noted sensorimotor deficits and oriented x 3. Assessment/Plan:   Unremarkable annual Gyn exam.    Cervical Cytology Evaluation begins at 24years old. If Negative Cytology, Follow-up screening per current guidelines. Mammograms every 1year. If 35 yo and last mammogram was negative.   Calcium and Vitamin D dosing reviewed. Colonoscopy screening reviewed as well as onset for bone density testing. Birth control and barrier recommendations discussed. STD counseling and prevention reviewed. Routine health maintenance per patients PCP.   Pt to follow up for annual exam in 1 year    Sophia Treviño MD  [address]

## 2023-06-08 SDOH — HEALTH STABILITY: PHYSICAL HEALTH: ON AVERAGE, HOW MANY MINUTES DO YOU ENGAGE IN EXERCISE AT THIS LEVEL?: 90 MIN

## 2023-06-08 SDOH — HEALTH STABILITY: PHYSICAL HEALTH: ON AVERAGE, HOW MANY DAYS PER WEEK DO YOU ENGAGE IN MODERATE TO STRENUOUS EXERCISE (LIKE A BRISK WALK)?: 3 DAYS

## 2023-06-09 ENCOUNTER — OFFICE VISIT (OUTPATIENT)
Dept: FAMILY MEDICINE CLINIC | Age: 43
End: 2023-06-09

## 2023-06-09 VITALS
OXYGEN SATURATION: 98 % | HEART RATE: 89 BPM | TEMPERATURE: 97.5 F | BODY MASS INDEX: 37.39 KG/M2 | WEIGHT: 219 LBS | DIASTOLIC BLOOD PRESSURE: 78 MMHG | SYSTOLIC BLOOD PRESSURE: 120 MMHG | HEIGHT: 64 IN

## 2023-06-09 DIAGNOSIS — Z12.31 ENCOUNTER FOR SCREENING MAMMOGRAM FOR MALIGNANT NEOPLASM OF BREAST: ICD-10-CM

## 2023-06-09 DIAGNOSIS — G89.29 CHRONIC NECK PAIN: ICD-10-CM

## 2023-06-09 DIAGNOSIS — Z76.89 ENCOUNTER TO ESTABLISH CARE: Primary | ICD-10-CM

## 2023-06-09 DIAGNOSIS — I73.00 RAYNAUD'S PHENOMENON WITHOUT GANGRENE: ICD-10-CM

## 2023-06-09 DIAGNOSIS — Z00.00 WELL ADULT EXAM: ICD-10-CM

## 2023-06-09 DIAGNOSIS — M54.2 CHRONIC NECK PAIN: ICD-10-CM

## 2023-06-09 SDOH — ECONOMIC STABILITY: FOOD INSECURITY: WITHIN THE PAST 12 MONTHS, YOU WORRIED THAT YOUR FOOD WOULD RUN OUT BEFORE YOU GOT MONEY TO BUY MORE.: NEVER TRUE

## 2023-06-09 SDOH — ECONOMIC STABILITY: FOOD INSECURITY: WITHIN THE PAST 12 MONTHS, THE FOOD YOU BOUGHT JUST DIDN'T LAST AND YOU DIDN'T HAVE MONEY TO GET MORE.: NEVER TRUE

## 2023-06-09 SDOH — ECONOMIC STABILITY: HOUSING INSECURITY
IN THE LAST 12 MONTHS, WAS THERE A TIME WHEN YOU DID NOT HAVE A STEADY PLACE TO SLEEP OR SLEPT IN A SHELTER (INCLUDING NOW)?: NO

## 2023-06-09 SDOH — ECONOMIC STABILITY: INCOME INSECURITY: HOW HARD IS IT FOR YOU TO PAY FOR THE VERY BASICS LIKE FOOD, HOUSING, MEDICAL CARE, AND HEATING?: NOT HARD AT ALL

## 2023-06-09 ASSESSMENT — ENCOUNTER SYMPTOMS
COLOR CHANGE: 1
SORE THROAT: 0
EYE DISCHARGE: 0
DIARRHEA: 0
ABDOMINAL PAIN: 0
WHEEZING: 0
CONSTIPATION: 0
COUGH: 0
CHEST TIGHTNESS: 0
NAUSEA: 0
SHORTNESS OF BREATH: 0
VOMITING: 0

## 2023-06-09 ASSESSMENT — PATIENT HEALTH QUESTIONNAIRE - PHQ9
SUM OF ALL RESPONSES TO PHQ QUESTIONS 1-9: 0
SUM OF ALL RESPONSES TO PHQ QUESTIONS 1-9: 0
SUM OF ALL RESPONSES TO PHQ9 QUESTIONS 1 & 2: 0
1. LITTLE INTEREST OR PLEASURE IN DOING THINGS: 0
2. FEELING DOWN, DEPRESSED OR HOPELESS: 0
SUM OF ALL RESPONSES TO PHQ QUESTIONS 1-9: 0
SUM OF ALL RESPONSES TO PHQ QUESTIONS 1-9: 0

## 2023-06-09 NOTE — PROGRESS NOTES
Comprehensive Metabolic Panel; Future  -     TSH with Reflex; Future  -     Lipid, Fasting; Future  -     Hemoglobin A1C; Future    3. Raynaud's phenomenon without gangrene  -chronic, discussed med management w/ [facility], pt declines at this time    4. Chronic neck pain  -xray for eval and PT    -     XR CERVICAL SPINE (4-5 VIEWS); Future  -     Ambulatory referral to Physical Therapy    5. Encounter for screening mammogram for malignant neoplasm of breast  -     [facility] [place] [place]IGITAL S[place]EN BILATERAL; Future      Return in about 1 year (around 6/9/2024), or if symptoms worsen or fail to improve, for annual physical.               An electronic signature was used to authenticate this note.     --Gia Velasco, APRN - CNP

## 2023-06-23 ENCOUNTER — HOSPITAL ENCOUNTER (OUTPATIENT)
Dept: GENERAL RADIOLOGY | Age: 43
End: 2023-06-23
Payer: COMMERCIAL

## 2023-06-23 ENCOUNTER — HOSPITAL ENCOUNTER (OUTPATIENT)
Age: 43
Discharge: HOME | End: 2023-06-23
Payer: COMMERCIAL

## 2023-06-23 DIAGNOSIS — G89.29 CHRONIC NECK PAIN: ICD-10-CM

## 2023-06-23 DIAGNOSIS — M54.2 CHRONIC NECK PAIN: ICD-10-CM

## 2023-06-23 PROCEDURE — 72050 X-RAY EXAM NECK SPINE 4/5VWS: CPT

## 2023-07-21 ENCOUNTER — HOSPITAL ENCOUNTER (OUTPATIENT)
Dept: MAMMOGRAPHY | Age: 43
Discharge: HOME | End: 2023-07-21
Payer: COMMERCIAL

## 2023-07-21 DIAGNOSIS — Z12.31 ENCOUNTER FOR SCREENING MAMMOGRAM FOR MALIGNANT NEOPLASM OF BREAST: ICD-10-CM

## 2023-07-21 PROCEDURE — 77063 BREAST TOMOSYNTHESIS BI: CPT

## (undated) DEVICE — AIRSEAL 8 MM ACCESS PORT AND LOW PROFILE OBTURATOR WITH BLADELESS OPTICAL TIP, 120 MM LENGTH: Brand: AIRSEAL

## (undated) DEVICE — ADHESIVE SKIN CLSR 0.7ML TOP DERMBND ADV

## (undated) DEVICE — Device: Brand: UTERINE ELEVATOR PRO

## (undated) DEVICE — INSUFFLATION NEEDLE TO ESTABLISH PNEUMOPERITONEUM.: Brand: INSUFFLATION NEEDLE

## (undated) DEVICE — 40580 - THE PINK PAD - ADVANCED TRENDELENBURG POSITIONING KIT: Brand: 40580 - THE PINK PAD - ADVANCED TRENDELENBURG POSITIONING KIT

## (undated) DEVICE — GLOVE SURG SZ 65 THK91MIL LTX FREE SYN POLYISOPRENE

## (undated) DEVICE — PROTECTOR ULN NRV PUR FOAM HK LOOP STRP ANATOMICALLY

## (undated) DEVICE — GLOVE ORANGE PI 7   MSG9070

## (undated) DEVICE — DRESSING TRNSPAR W5XL4.5IN FLM SHT SEMIPERMEABLE WIND

## (undated) DEVICE — TOTAL TRAY, 16FR 10ML SIL FOLEY, URN: Brand: MEDLINE

## (undated) DEVICE — GLOVE SURG SZ 7 CRM LTX FREE POLYISOPRENE POLYMER BEAD ANTI

## (undated) DEVICE — CONTAINER,SPECIMEN,4OZ,OR STRL: Brand: MEDLINE

## (undated) DEVICE — ELECTRO LUBE IS A SINGLE PATIENT USE DEVICE THAT IS INTENDED TO BE USED ON ELECTROSURGICAL ELECTRODES TO REDUCE STICKING.: Brand: KEY SURGICAL ELECTRO LUBE

## (undated) DEVICE — SUTURE VCRL SZ 0 L27IN ABSRB UD L26MM CT-2 1/2 CIR J270H

## (undated) DEVICE — SVMMC GYN ROBOTIC PK

## (undated) DEVICE — PLUMEPORT SEO LAPAROSCOPIC SMOKE FILTRATION DEVICE: Brand: PLUMEPORT

## (undated) DEVICE — TOWEL,OR,DSP,ST,NATURAL,DLX,4/PK,20PK/CS: Brand: MEDLINE

## (undated) DEVICE — POUCH INSTR W6.75XL11.5IN FRST 2 PKT ADH FOR ORTH AND

## (undated) DEVICE — UNDERPANTS MAT L/XL KNIT SEAMLESS CLR CODE WAISTBAND

## (undated) DEVICE — COUNTER NDL 10 COUNT HLD 20 FOAM BLK SGL MAG

## (undated) DEVICE — APPLICATOR MEDICATED 26 CC SOLUTION HI LT ORNG CHLORAPREP

## (undated) DEVICE — TIP COVER ACCESSORY

## (undated) DEVICE — SUTURE MCRYL + SZ 4 0 L18IN ABSRB UD PC 3 L16MM 3 8 CIR PRIM MCP845G

## (undated) DEVICE — ARM DRAPE

## (undated) DEVICE — CANNULA SEAL

## (undated) DEVICE — GARMENT,MEDLINE,DVT,INT,CALF,MED, GEN2: Brand: MEDLINE

## (undated) DEVICE — SCISSOR SURG METZ CRV TIP

## (undated) DEVICE — PAD,SANITARY,11 IN,MAXI,W/WINGS,N-STRL: Brand: MEDLINE

## (undated) DEVICE — GAUZE,SPONGE,FLUFF,6"X6.75",STRL,5/TRAY: Brand: MEDLINE

## (undated) DEVICE — TRI-LUMEN FILTERED TUBE SET WITH ACTIVATED CHARCOAL FILTER: Brand: AIRSEAL

## (undated) DEVICE — CYSTO/BLADDER IRRIGATION SET, REGULATING CLAMP

## (undated) DEVICE — GLOVE SURG SZ 6 THK91MIL LTX FREE SYN POLYISOPRENE ANTI

## (undated) DEVICE — GLOVE SURG SZ 75 CRM LTX FREE POLYISOPRENE POLYMER BEAD ANTI

## (undated) DEVICE — GOWN,AURORA,NONREINFORCED,LARGE: Brand: MEDLINE